# Patient Record
Sex: MALE
[De-identification: names, ages, dates, MRNs, and addresses within clinical notes are randomized per-mention and may not be internally consistent; named-entity substitution may affect disease eponyms.]

---

## 2020-08-10 ENCOUNTER — APPOINTMENT (OUTPATIENT)
Dept: ORTHOPEDIC SURGERY | Facility: CLINIC | Age: 45
End: 2020-08-10
Payer: COMMERCIAL

## 2020-08-10 VITALS — TEMPERATURE: 97.3 F

## 2020-08-10 VITALS — SYSTOLIC BLOOD PRESSURE: 110 MMHG | DIASTOLIC BLOOD PRESSURE: 65 MMHG | HEART RATE: 73 BPM

## 2020-08-10 VITALS — BODY MASS INDEX: 27.92 KG/M2 | WEIGHT: 195 LBS | HEIGHT: 70 IN

## 2020-08-10 DIAGNOSIS — M25.511 PAIN IN RIGHT SHOULDER: ICD-10-CM

## 2020-08-10 DIAGNOSIS — M25.512 PAIN IN RIGHT SHOULDER: ICD-10-CM

## 2020-08-10 PROBLEM — Z00.00 ENCOUNTER FOR PREVENTIVE HEALTH EXAMINATION: Status: ACTIVE | Noted: 2020-08-10

## 2020-08-10 PROCEDURE — 73030 X-RAY EXAM OF SHOULDER: CPT | Mod: LT

## 2020-08-10 PROCEDURE — 99203 OFFICE O/P NEW LOW 30 MIN: CPT | Mod: 25

## 2020-08-27 PROBLEM — M25.511 BILATERAL SHOULDER PAIN: Status: ACTIVE | Noted: 2020-08-27

## 2020-08-27 NOTE — DISCUSSION/SUMMARY
[de-identified] : 45-year-old male with bilateral shoulder pain\par \par Patient presents today for evaluation of bilateral shoulder pain.  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  On the left shoulder patient is status post clavicle fracture, and has some weakness on external rotation is felt on clinical examination.  No significant internal derangement is suspected acutely, so recommendations were made for trial of physical therapy.\par \par Recommendation: Conservative care & observation, this includes rest/activity avoidance until less symptomatic with subsequent gradual return to full activity as tolerated. Patient may also use OTC NSAID's or acetaminophen as tolerated, with application of ice to the area 2-3x daily for 20 minutes after periods of activity. \par \par PT prescription provided\par \par Follow-up as needed

## 2020-08-27 NOTE — HISTORY OF PRESENT ILLNESS
[de-identified] : 45 year male who presents to office complaining of bilateral shoulder pain with insidious onset for a few months. Patient says he has been practising yoga, and when he is doing overhead movements in particular, he feels an intermittent sharp pain in the anterior/superior aspects of his shoulders. His pain goes away with rest. Pain does not radiate or result in distal neuropathy. He has not treated his pain with any conservative measures.\par \par All review of systems, family history, social history, surgical history, past medical history, medications, and allergies not previously stated as positive are negative. They were reviewed by me today with the patient and documented accordingly.

## 2020-08-27 NOTE — PHYSICAL EXAM
[de-identified] : Oriented to time, place, person\par Mood: Normal\par Affect: Normal\par Appearance: Healthy, well appearing, no acute distress.\par Gait: Normal\par Assistive Devices: None\par \par Right shoulder exam\par \par Inspection: No malalignment, No defects, No atrophy\par Skin: No masses, No lesions\par Neck: Negative Spurlings, full ROM, no pain with ROM\par AROM: FF to 180, abduction to 90, ER to 70, IR to mid lumbar\par Painful arc ROM: Painful forward elevation/internal rotation\par Tenderness: No bicipital tenderness, no tenderness to the greater tuberosity/RTC insertion, no anterior shoulder/lesser tuberosity tenderness\par Strength: 5/5 ER, 5/5 IR in adduction, 5/5 supraspinatus testing, mild O'Briens test\par AC Joint: No ttp/pain with cross arm testing\par Biceps: Speed Negative, Yergusons Negative\par Impingement test: Positive Torres, Positive Neer \par Stability: Stable Vasc: 2+ radial pulse\par Neuro: AIN, PIN, Ulnar nerve in tact to motor\par Sensation: In tact to light touch throughout\par \par Left shoulder exam\par \par Inspection: Prior clavicle deformity, No defects, No atrophy\par Skin: No masses, No lesions\par Neck: Negative Spurlings, full ROM, no pain with ROM\par AROM: FF to 180, abduction to 90, ER to 70, IR to mid lumbar\par Painful arc ROM: Painful forward elevation/internal rotation\par Tenderness: No bicipital tenderness, no tenderness to the greater tuberosity/RTC insertion, no anterior shoulder/lesser tuberosity tenderness\par Strength: 4/5 ER, 5/5 IR in adduction, 5/5 supraspinatus testing, mild O'Briens test\par AC Joint: No ttp/pain with cross arm testing\par Biceps: Speed Negative, Yergusons Negative\par Impingement test: Positive Torres, Positive Neer \par Stability: Stable Vasc: 2+ radial pulse\par Neuro: AIN, PIN, Ulnar nerve in tact to motor\par Sensation: In tact to light touch throughout [de-identified] : \par The following radiographs were ordered and read by me during this patients visit. I reviewed each radiograph in detail with the patient and discussed the findings as highlighted below. \par \par 3 views of the right shoulder were obtained today that show no acute fracture or dislocation. There is moderate glenohumeral and mild AC joint degenerative change seen. Type II acromion. There is no significant malalignment. No significant other obvious osseous abnormality, otherwise unremarkable.\par \par 3 views of the left shoulder were obtained today that show no acute fracture or dislocation. Prior midshaft clavicle fx. There is no glenohumeral and mild AC joint degenerative change seen. Type II acromion. There is no significant malalignment. No significant other obvious osseous abnormality, otherwise unremarkable.